# Patient Record
Sex: FEMALE | Race: WHITE | Employment: UNEMPLOYED
[De-identification: names, ages, dates, MRNs, and addresses within clinical notes are randomized per-mention and may not be internally consistent; named-entity substitution may affect disease eponyms.]

---

## 2017-06-03 ENCOUNTER — HEALTH MAINTENANCE LETTER (OUTPATIENT)
Age: 47
End: 2017-06-03

## 2018-11-16 ENCOUNTER — APPOINTMENT (OUTPATIENT)
Dept: CT IMAGING | Facility: CLINIC | Age: 48
End: 2018-11-16
Attending: EMERGENCY MEDICINE
Payer: COMMERCIAL

## 2018-11-16 ENCOUNTER — HOSPITAL ENCOUNTER (EMERGENCY)
Facility: CLINIC | Age: 48
Discharge: HOME | End: 2018-11-16
Attending: EMERGENCY MEDICINE | Admitting: EMERGENCY MEDICINE
Payer: COMMERCIAL

## 2018-11-16 ENCOUNTER — APPOINTMENT (OUTPATIENT)
Dept: ULTRASOUND IMAGING | Facility: CLINIC | Age: 48
End: 2018-11-16
Attending: EMERGENCY MEDICINE
Payer: COMMERCIAL

## 2018-11-16 VITALS
WEIGHT: 130 LBS | RESPIRATION RATE: 16 BRPM | TEMPERATURE: 98.3 F | HEIGHT: 67 IN | BODY MASS INDEX: 20.4 KG/M2 | OXYGEN SATURATION: 99 % | HEART RATE: 85 BPM | SYSTOLIC BLOOD PRESSURE: 97 MMHG | DIASTOLIC BLOOD PRESSURE: 66 MMHG

## 2018-11-16 DIAGNOSIS — R11.2 NAUSEA AND VOMITING, INTRACTABILITY OF VOMITING NOT SPECIFIED, UNSPECIFIED VOMITING TYPE: ICD-10-CM

## 2018-11-16 DIAGNOSIS — N83.201 CYST OF RIGHT OVARY: ICD-10-CM

## 2018-11-16 LAB
ALBUMIN SERPL-MCNC: 3.7 G/DL (ref 3.4–5)
ALBUMIN UR-MCNC: NEGATIVE MG/DL
ALP SERPL-CCNC: 43 U/L (ref 40–150)
ALT SERPL W P-5'-P-CCNC: 24 U/L (ref 0–50)
ANION GAP SERPL CALCULATED.3IONS-SCNC: 8 MMOL/L (ref 3–14)
APPEARANCE UR: CLEAR
AST SERPL W P-5'-P-CCNC: 19 U/L (ref 0–45)
BASOPHILS # BLD AUTO: 0 10E9/L (ref 0–0.2)
BASOPHILS NFR BLD AUTO: 0.1 %
BILIRUB SERPL-MCNC: 0.9 MG/DL (ref 0.2–1.3)
BILIRUB UR QL STRIP: NEGATIVE
BUN SERPL-MCNC: 13 MG/DL (ref 7–30)
CALCIUM SERPL-MCNC: 8.7 MG/DL (ref 8.5–10.1)
CHLORIDE SERPL-SCNC: 104 MMOL/L (ref 94–109)
CO2 SERPL-SCNC: 27 MMOL/L (ref 20–32)
COLOR UR AUTO: YELLOW
CREAT SERPL-MCNC: 0.56 MG/DL (ref 0.52–1.04)
DIFFERENTIAL METHOD BLD: ABNORMAL
EOSINOPHIL # BLD AUTO: 0 10E9/L (ref 0–0.7)
EOSINOPHIL NFR BLD AUTO: 0.1 %
ERYTHROCYTE [DISTWIDTH] IN BLOOD BY AUTOMATED COUNT: 11.9 % (ref 10–15)
GFR SERPL CREATININE-BSD FRML MDRD: >90 ML/MIN/1.7M2
GLUCOSE SERPL-MCNC: 105 MG/DL (ref 70–99)
GLUCOSE UR STRIP-MCNC: NEGATIVE MG/DL
HCG UR QL: NEGATIVE
HCT VFR BLD AUTO: 38.7 % (ref 35–47)
HGB BLD-MCNC: 13 G/DL (ref 11.7–15.7)
HGB UR QL STRIP: ABNORMAL
IMM GRANULOCYTES # BLD: 0 10E9/L (ref 0–0.4)
IMM GRANULOCYTES NFR BLD: 0.2 %
KETONES UR STRIP-MCNC: NEGATIVE MG/DL
LEUKOCYTE ESTERASE UR QL STRIP: NEGATIVE
LYMPHOCYTES # BLD AUTO: 0.6 10E9/L (ref 0.8–5.3)
LYMPHOCYTES NFR BLD AUTO: 3.6 %
MCH RBC QN AUTO: 33 PG (ref 26.5–33)
MCHC RBC AUTO-ENTMCNC: 33.6 G/DL (ref 31.5–36.5)
MCV RBC AUTO: 98 FL (ref 78–100)
MONOCYTES # BLD AUTO: 0.8 10E9/L (ref 0–1.3)
MONOCYTES NFR BLD AUTO: 5 %
NEUTROPHILS # BLD AUTO: 15.3 10E9/L (ref 1.6–8.3)
NEUTROPHILS NFR BLD AUTO: 91 %
NITRATE UR QL: NEGATIVE
PH UR STRIP: 8 PH (ref 5–7)
PLATELET # BLD AUTO: 232 10E9/L (ref 150–450)
POTASSIUM SERPL-SCNC: 3.9 MMOL/L (ref 3.4–5.3)
PROT SERPL-MCNC: 7.1 G/DL (ref 6.8–8.8)
RBC # BLD AUTO: 3.94 10E12/L (ref 3.8–5.2)
RBC #/AREA URNS AUTO: 2 /HPF (ref 0–2)
SODIUM SERPL-SCNC: 139 MMOL/L (ref 133–144)
SOURCE: ABNORMAL
SP GR UR STRIP: 1.02 (ref 1–1.03)
SQUAMOUS #/AREA URNS AUTO: 1 /HPF (ref 0–1)
UROBILINOGEN UR STRIP-MCNC: NORMAL MG/DL (ref 0–2)
WBC # BLD AUTO: 16.8 10E9/L (ref 4–11)
WBC #/AREA URNS AUTO: 0 /HPF (ref 0–5)

## 2018-11-16 PROCEDURE — 96361 HYDRATE IV INFUSION ADD-ON: CPT

## 2018-11-16 PROCEDURE — 96374 THER/PROPH/DIAG INJ IV PUSH: CPT | Mod: 59

## 2018-11-16 PROCEDURE — 25000128 H RX IP 250 OP 636: Performed by: EMERGENCY MEDICINE

## 2018-11-16 PROCEDURE — 99285 EMERGENCY DEPT VISIT HI MDM: CPT | Mod: 25

## 2018-11-16 PROCEDURE — 85025 COMPLETE CBC W/AUTO DIFF WBC: CPT | Performed by: EMERGENCY MEDICINE

## 2018-11-16 PROCEDURE — 74177 CT ABD & PELVIS W/CONTRAST: CPT

## 2018-11-16 PROCEDURE — 76830 TRANSVAGINAL US NON-OB: CPT

## 2018-11-16 PROCEDURE — 80053 COMPREHEN METABOLIC PANEL: CPT | Performed by: EMERGENCY MEDICINE

## 2018-11-16 PROCEDURE — 81001 URINALYSIS AUTO W/SCOPE: CPT | Performed by: EMERGENCY MEDICINE

## 2018-11-16 PROCEDURE — 81025 URINE PREGNANCY TEST: CPT | Performed by: EMERGENCY MEDICINE

## 2018-11-16 PROCEDURE — 25000125 ZZHC RX 250: Performed by: EMERGENCY MEDICINE

## 2018-11-16 RX ORDER — IOPAMIDOL 755 MG/ML
65 INJECTION, SOLUTION INTRAVASCULAR ONCE
Status: COMPLETED | OUTPATIENT
Start: 2018-11-16 | End: 2018-11-16

## 2018-11-16 RX ORDER — MORPHINE SULFATE 4 MG/ML
4 INJECTION, SOLUTION INTRAMUSCULAR; INTRAVENOUS ONCE
Status: COMPLETED | OUTPATIENT
Start: 2018-11-16 | End: 2018-11-16

## 2018-11-16 RX ADMIN — SODIUM CHLORIDE 1000 ML: 9 INJECTION, SOLUTION INTRAVENOUS at 11:26

## 2018-11-16 RX ADMIN — IOPAMIDOL 65 ML: 755 INJECTION, SOLUTION INTRAVENOUS at 10:27

## 2018-11-16 RX ADMIN — MORPHINE SULFATE 4 MG: 4 INJECTION INTRAVENOUS at 10:44

## 2018-11-16 RX ADMIN — SODIUM CHLORIDE, PRESERVATIVE FREE 60 ML: 5 INJECTION INTRAVENOUS at 10:26

## 2018-11-16 ASSESSMENT — ENCOUNTER SYMPTOMS
DIARRHEA: 0
BACK PAIN: 0
DYSURIA: 0
WEAKNESS: 1
SORE THROAT: 0
COUGH: 0
DIZZINESS: 1
SHORTNESS OF BREATH: 0
ABDOMINAL PAIN: 1
FEVER: 0
CONSTIPATION: 0
DIFFICULTY URINATING: 0
FREQUENCY: 0
HEMATURIA: 0
VOMITING: 1
NAUSEA: 1
FLANK PAIN: 0

## 2018-11-16 NOTE — DISCHARGE INSTRUCTIONS
Ovarian Cysts  A cyst is usually a fluid-filled sac, like a small water balloon. Cysts are almost always harmless, and many go away on their own. Usually they grow slowly. They can vary in size from as small as a pea to larger than a grapefruit. Many cause no symptoms at all. Often they are felt only during a pelvic exam. Ovarian cysts are usually not cancer.       Functional cyst  A functional cyst is the most common kind of cyst. It forms when a follicle does not release a mature egg or continues to grow after releasing the egg. Functional cysts usually occur on only one ovary at a time. They usually shrink on their own in 1 to 3 months. In rare cases, a cyst will burst (rupture), causing pain. Pain might also be caused by the twisting of an ovary that is enlarged because of the cyst growing on it.     Dermoid cyst  Sometimes cells that are present from birth will start to grow into different kinds of tissue such as skin, fat, hair, and teeth. This kind of cyst is called a dermoid cyst. Dermoid cysts can grow on one or both ovaries. Usually they cause no symptoms. But if they leak or the ovary becomes twisted, they can cause severe pain.    Endometrioma  Sometimes tissue similar to the lining of the uterus (endometrium) grows and becomes part of the ovary. This kind of cyst is often called a chocolate cyst because of its dark-brown color. These cysts can grow on one or both ovaries. They often cause pain, especially around menstruation or during sex.    Benign cystadenoma  If the capsule that surrounds the ovary grows, it can form a cystadenoma. These cysts can grow on one or both ovaries. Usually they cause no symptoms if they are small. But if they become large, they can press on organs near the ovaries, causing pain. They can also cause pain by stretching the ovarian capsule. A cyst that pushes on the bladder can cause frequent urination. Sometimes these cysts rupture and bleed.  Malignant cysts  These cysts  can invade other tissues or spread to other parts of the body.  Date Last Reviewed: 6/1/2017 2000-2018 [employer]. [address] [place] [address], [place] [address]. All rights reserved. This information is not intended as a substitute for professional medical care. Always follow your healthcare professional's instructions.

## 2018-11-16 NOTE — ED AVS SNAPSHOT
Emergency Department    [address] [facility] [address]    Phone:  [phone]    Fax:  [phone]                                       Elmira Mccarty   MRN: [id number]    Department:   Emergency Department   Date of Visit:  11/16/2018           Patient Information     Date Of Birth          1970        Your diagnoses for this visit were:     Cyst of right ovary     Nausea and vomiting, intractability of vomiting not specified, unspecified vomiting type        You were seen by Brenda Fulton MD.      Follow-up Information     Follow up with Greta Aguirre PA-C In 2 days.    Specialty:  Physician Assistant    Why:  Return to the ED if worsening pain, fevers, or persistent vomiting.,    Contact information:    [facility]  [address] [facility] [address]  [facility] [address]  [phone]          Discharge Instructions         Ovarian Cysts  A cyst is usually a fluid-filled sac, like a small water balloon. Cysts are almost always harmless, and many go away on their own. Usually they grow slowly. They can vary in size from as small as a pea to larger than a grapefruit. Many cause no symptoms at all. Often they are felt only during a pelvic exam. Ovarian cysts are usually not cancer.       Functional cyst  A functional cyst is the most common kind of cyst. It forms when a follicle does not release a mature egg or continues to grow after releasing the egg. Functional cysts usually occur on only one ovary at a time. They usually shrink on their own in 1 to 3 months. In rare cases, a cyst will burst (rupture), causing pain. Pain might also be caused by the twisting of an ovary that is enlarged because of the cyst growing on it.     Dermoid cyst  Sometimes cells that are present from birth will start to grow into different kinds of tissue such as skin, fat, hair, and teeth. This kind of cyst is called a dermoid cyst. Dermoid cysts can grow on one or both ovaries. Usually  they cause no symptoms. But if they leak or the ovary becomes twisted, they can cause severe pain.    Endometrioma  Sometimes tissue similar to the lining of the uterus (endometrium) grows and becomes part of the ovary. This kind of cyst is often called a chocolate cyst because of its dark-brown color. These cysts can grow on one or both ovaries. They often cause pain, especially around menstruation or during sex.    Benign cystadenoma  If the capsule that surrounds the ovary grows, it can form a cystadenoma. These cysts can grow on one or both ovaries. Usually they cause no symptoms if they are small. But if they become large, they can press on organs near the ovaries, causing pain. They can also cause pain by stretching the ovarian capsule. A cyst that pushes on the bladder can cause frequent urination. Sometimes these cysts rupture and bleed.  Malignant cysts  These cysts can invade other tissues or spread to other parts of the body.  Date Last Reviewed: 6/1/2017 2000-2018 [employer]. [address] [place] [address]. All rights reserved. This information is not intended as a substitute for professional medical care. Always follow your healthcare professional's instructions.          24 Hour Appointment Hotline       To make an appointment at any [facility], call [phone] ([phone]). If you don't have a family doctor or clinic, we will help you find one. [facility] are conveniently located to serve the needs of you and your family.             Review of your medicines      Our records show that you are taking the medicines listed below. If these are incorrect, please call your family doctor or clinic.        Dose / Directions Last dose taken    CITALOPRAM HYDROBROMIDE PO        Refills:  0        ZOVIRAX PO        Refills:  0                Procedures and tests performed during your visit     CBC with platelets differential    CT Abdomen Pelvis w Contrast     Comprehensive metabolic panel    Give 20 ounces of water 15 minutes before CT of abdomen    HCG qualitative urine    UA with Microscopic    US Pelvic Complete w Transvaginal & Abd/Pel Duplex Limited      Orders Needing Specimen Collection     None      Pending Results     No orders found from 11/14/2018 to 11/17/2018.            Pending Culture Results     No orders found from 11/14/2018 to 11/17/2018.            Pending Results Instructions     If you had any lab results that were not finalized at the time of your Discharge, you can call the ED Lab Result RN at [phone]. You will be contacted by this team for any positive Lab results or changes in treatment. The nurses are available 7 days a week from 10A to 6:30P.  You can leave a message 24 hours per day and they will return your call.        Test Results From Your Hospital Stay        11/16/2018 10:42 AM      Component Results     Component Value Ref Range & Units Status    WBC 16.8 (H) 4.0 - 11.0 10e9/L Final    RBC Count 3.94 3.8 - 5.2 10e12/L Final    Hemoglobin 13.0 11.7 - 15.7 g/dL Final    Hematocrit 38.7 35.0 - 47.0 % Final    MCV 98 78 - 100 fl Final    MCH 33.0 26.5 - 33.0 pg Final    MCHC 33.6 31.5 - 36.5 g/dL Final    RDW 11.9 10.0 - 15.0 % Final    Platelet Count 232 150 - 450 10e9/L Final    Diff Method Automated Method  Final    % Neutrophils 91.0 % Final    % Lymphocytes 3.6 % Final    % Monocytes 5.0 % Final    % Eosinophils 0.1 % Final    % Basophils 0.1 % Final    % Immature Granulocytes 0.2 % Final    Absolute Neutrophil 15.3 (H) 1.6 - 8.3 10e9/L Final    Absolute Lymphocytes 0.6 (L) 0.8 - 5.3 10e9/L Final    Absolute Monocytes 0.8 0.0 - 1.3 10e9/L Final    Absolute Eosinophils 0.0 0.0 - 0.7 10e9/L Final    Absolute Basophils 0.0 0.0 - 0.2 10e9/L Final    Abs Immature Granulocytes 0.0 0 - 0.4 10e9/L Final         11/16/2018 10:03 AM      Component Results     Component Value Ref Range & Units Status    Sodium 139 133 - 144 mmol/L Final     Potassium 3.9 3.4 - 5.3 mmol/L Final    Chloride 104 94 - 109 mmol/L Final    Carbon Dioxide 27 20 - 32 mmol/L Final    Anion Gap 8 3 - 14 mmol/L Final    Glucose 105 (H) 70 - 99 mg/dL Final    Urea Nitrogen 13 7 - 30 mg/dL Final    Creatinine 0.56 0.52 - 1.04 mg/dL Final    GFR Estimate >90 >60 mL/min/1.7m2 Final    Non  GFR Calc    GFR Estimate If Black >90 >60 mL/min/1.7m2 Final    African American GFR Calc    Calcium 8.7 8.5 - 10.1 mg/dL Final    Bilirubin Total 0.9 0.2 - 1.3 mg/dL Final    Albumin 3.7 3.4 - 5.0 g/dL Final    Protein Total 7.1 6.8 - 8.8 g/dL Final    Alkaline Phosphatase 43 40 - 150 U/L Final    ALT 24 0 - 50 U/L Final    AST 19 0 - 45 U/L Final         11/16/2018  9:48 AM      Component Results     Component Value Ref Range & Units Status    HCG Qual Urine Negative NEG^Negative Final    This test is for screening purposes.  Results should be interpreted along with   the clinical picture.  Confirmation testing is available if warranted by   ordering [id number], HCG Quantitative Pregnancy.           11/16/2018  9:47 AM      Component Results     Component Value Ref Range & Units Status    Color Urine Yellow  Final    Appearance Urine Clear  Final    Glucose Urine Negative NEG^Negative mg/dL Final    Bilirubin Urine Negative NEG^Negative Final    Ketones Urine Negative NEG^Negative mg/dL Final    Specific Gravity Urine 1.017 1.003 - 1.035 Final    Blood Urine Trace (A) NEG^Negative Final    pH Urine 8.0 (H) 5.0 - 7.0 pH Final    Protein Albumin Urine Negative NEG^Negative mg/dL Final    Urobilinogen mg/dL Normal 0.0 - 2.0 mg/dL Final    Nitrite Urine Negative NEG^Negative Final    Leukocyte Esterase Urine Negative NEG^Negative Final    Source Midstream Urine  Final    WBC Urine 0 0 - 5 /HPF Final    RBC Urine 2 0 - 2 /HPF Final    Squamous Epithelial /HPF Urine 1 0 - 1 /HPF Final         11/16/2018 10:56 AM      Narrative     CT ABDOMEN AND PELVIS WITH CONTRAST  11/16/2018 10:39  AM    HISTORY: Right lower quadrant pain.    COMPARISON: None.    TECHNIQUE: Routine transverse CT imaging of the abdomen and pelvis was  performed following the uneventful administration of 65 mL Isovue-370  intravenous contrast. Radiation dose for this scan was reduced using  automated exposure control, adjustment of the mA and/or kV according  to patient size, or iterative reconstruction technique.    FINDINGS: There is mild dependent atelectasis of both lower lungs. The  visualized lungs are otherwise clear. The liver, spleen, pancreas,  gallbladder, adrenal glands, and bladder are normal. There is a 0.2 cm  nonobstructing calculus in the left mid kidney. The kidneys are  otherwise unremarkable. No enlarged lymph node is seen. There are two  adjacent cysts or a single septated cyst in the right adnexa of the  pelvis, likely an ovarian cyst. This measures up to 3.4 cm in greatest  dimension. A few small cysts within the lower uterus likely represent  nabothian cysts. No other abnormal mass is seen. No free fluid is  seen. No free intraperitoneal gas is identified. Fluid is seen within  a few nondistended loops of small bowel. The gastrointestinal tract is  otherwise unremarkable. There is a normal-appearing appendix. No  vascular abnormality is seen. The osseous structures are unremarkable.  No abdominal or pelvic wall pathology is demonstrated.         Impression     IMPRESSION:   1. There is a 0.2 cm nonobstructing calculus of the left kidney.  2. There is a 3.4 cm septated cyst or two adjacent smaller cysts in  the right adnexa of the pelvis, likely ovarian in origin.  3. Normal-appearing appendix.     KARISSA TRAVIS MD         11/16/2018 12:04 PM      Narrative     PELVIC ULTRASOUND TRANSABDOMINAL IMAGING AND TRANSVAGINAL IMAGING   11/16/2018 11:51 AM    HISTORY: Pelvic pain.     COMPARISON: None.    TECHNIQUE: Transabdominal imaging was performed. Transvaginal imaging  was also performed.     FINDING:  The uterus measures 9.2 x 5.8 x 4.8 cm. It demonstrates  normal echogenicity with no myometrial abnormality seen. The  endometrium is normal measuring 0.9 cm. The right ovary contains two  follicles measuring 1.8 and 1.7 cm, respectively. The left ovary is  normal. Normal blood flow is seen in both ovaries. No adnexal  pathology is seen. There is no free fluid in the cul-de-sac.        Impression     IMPRESSION: Simple follicular cysts of the right ovary.    KARISSA TRAVIS MD                Clinical Quality Measure: Blood Pressure Screening     Your blood pressure was checked while you were in the emergency department today. The last reading we obtained was  BP: 91/57 . Please read the guidelines below about what these numbers mean and what you should do about them.  If your systolic blood pressure (the top number) is less than 120 and your diastolic blood pressure (the bottom number) is less than 80, then your blood pressure is normal. There is nothing more that you need to do about it.  If your systolic blood pressure (the top number) is 120-139 or your diastolic blood pressure (the bottom number) is 80-89, your blood pressure may be higher than it should be. You should have your blood pressure rechecked within a year by a primary care provider.  If your systolic blood pressure (the top number) is 140 or greater or your diastolic blood pressure (the bottom number) is 90 or greater, you may have high blood pressure. High blood pressure is treatable, but if left untreated over time it can put you at risk for heart attack, stroke, or kidney failure. You should have your blood pressure rechecked by a primary care provider within the next 4 weeks.  If your provider in the emergency department today gave you specific instructions to follow-up with your doctor or provider even sooner than that, you should follow that instruction and not wait for up to 4 weeks for your follow-up visit.        Thank you for choosing  [facility]       Thank you for choosing [facility] for your care. Our goal is always to provide you with excellent care. Hearing back from our patients is one way we can continue to improve our services. Please take a few minutes to complete the written survey that you may receive in the mail after you visit with us. Thank you!        [facility] Information     [facility] gives you secure access to your electronic health record. If you see a primary care provider, you can also send messages to your care team and make appointments. If you have questions, please call your primary care clinic.  If you do not have a primary care provider, please call [phone] and they will assist you.        Care EveryWhere ID     This is your Care EveryWhere ID. This could be used by other organizations to access your [facility] medical records  [id number]        Equal Access to Services     TAMIKO IBARRA : Kurtis Kaur, yan horn, edmund shah, guru meng. So [facility] [phone].    ATENCIÓN: Si habla español, tiene a nichols disposición servicios gratuitos de asistencia lingüística. Llame al [phone].    We comply with applicable federal civil rights laws and [place] laws. We do not discriminate on the basis of race, color, national origin, age, disability, sex, sexual orientation, or gender identity.            After Visit Summary       This is your record. Keep this with you and show to your community pharmacist(s) and doctor(s) at your next visit.

## 2018-11-16 NOTE — ED PROVIDER NOTES
"  History     Chief Complaint:  Abdominal Pain    HPI   Elmira Mccarty is a 47 year old female who presents with abdominal pain. Patient states that she woke up this morning at around 0400 with right sided abdominal pain, nausea, vomiting, weakness, and dizziness. She describe the pain as cramping. She is currently on her menstrual period, these have become more irregular. She states that she has never had this pain before. She denies any fever, sore throat, cough, shortness of breath, back pain, flank pain, urinary or bowel abnormalities, recent travel, recent antibiotics, and history of kidney stones or gall stones.    Allergies:  No known drug allergies.    Medications:    The patient is currently on no regular medications.    Past Medical History:    History reviewed.  No significant past medical history.     Past Surgical History:     section    Family History:    History reviewed. No pertinent family history.    Social History:  Patient is marred  PCP: Greta Aguirre     Review of Systems   Constitutional: Negative for fever.   HENT: Negative for sore throat.    Respiratory: Negative for cough and shortness of breath.    Gastrointestinal: Positive for abdominal pain, nausea and vomiting. Negative for constipation and diarrhea.   Genitourinary: Negative for difficulty urinating, dysuria, flank pain, frequency and hematuria.   Musculoskeletal: Negative for back pain.   Neurological: Positive for dizziness and weakness.   All other systems reviewed and are negative.    Physical Exam   First Vitals:  Patient Vitals for the past 24 hrs:   BP Temp Temp src Pulse Resp SpO2 Height Weight   18 1124 91/57 - - - - 99 % - -   18 1100 98/57 - - - - 98 % - -   18 1047 94/61 - - - - 100 % - -   18 0910 101/56 98.3  F (36.8  C) Temporal 85 16 99 % 1.702 m (5' 7\") 59 kg (130 lb)     Physical Exam    Physical Exam   Constitutional:  Patient is oriented to person, place, and time. They " appear well-developed and well-nourished. Mild distress secondary to abdominal pain.   HENT:   Mouth/Throat:   Oropharynx is clear and moist.   Eyes:    Conjunctivae normal and EOM are normal. Pupils are equal, round, and reactive to light.   Neck:    Normal range of motion.   Cardiovascular: Normal rate, regular rhythm and normal heart sounds.  Exam reveals no gallop and no friction rub.  No murmur heard.  Pulmonary/Chest:  Effort normal and breath sounds normal. Patient has no wheezes. Patient has no rales.   Abdominal:   Soft. Bowel sounds are normal. Patient exhibits no mass. Right lower quadrant pain that's a little migratory. There is no rebound and no guarding.   Musculoskeletal:  Normal range of motion. Patient exhibits no edema.   Neurological:   Patient is alert and oriented to person, place, and time. Patient has normal strength. No cranial nerve deficit or sensory deficit. GCS 15  Skin:   Skin is warm and dry. No rash noted. No erythema.   Psychiatric:   Patient has a normal mood and affect. Patient's behavior is normal. Judgment and thought content normal.         Emergency Department Course     Imaging:  Radiographic findings were communicated with the patient who voiced understanding of the findings.  CT abdomen pelvis w contrast:  1. There is a 0.2 cm nonobstructing calculus of the left kidney.  2. There is a 3.4 cm septated cyst or two adjacent smaller cysts in  the right adnexa of the pelvis, likely ovarian in origin.  3. Normal-appearing appendix. Per Radiology read.  US pelvis complete w transvaginal & abd/pel duplex limited:  Simple follicular cysts of the right ovary per Radiology read.    Laboratory:  CBC:  WBC 16.8 high, HGB 13.0,   CMP: Glucose 105 high, o/w WNL. (Creatinine 0.56)  HCG: Negative  UA: Blood trace (A), Ph 8.0 high, o/w WNL    Interventions:  1044: Morphine 4mg IV  1027: Isovue 65mL IV  1126: NS 1L IV    Emergency Department Course:  Nursing notes and vitals reviewed.  I  performed an exam of the patient as documented above.   11:17 AM Rechecked on and update the patient.  Findings and plan explained to the patient. She was made aware of the incidental finding of a non-obstructive kidney stone. Patient discharged home with instructions regarding supportive care, medications, and reasons to return. The importance of close follow-up was reviewed.     Impression & Plan      Medical Decision Making:  Elmira Mccarty is a 47 year old female presenting with right-sided abdominal pain and vomiting. She did not have a surgical abdomen on exam but she did have reproducible right lower quadrant pain. Blood work and imaging was performed as noted above. She does have leukocytosis but in the setting of no fever or infectious etiology and finding on CT this may be demargination due to her vomiting episode this morning. Imaging of her abdomen using a CT was initially performed and there was no evidence of appendicitis, bowel obstructions, colitis, pyelonephritis, and diverticulitis. She does have an non obstructive kidney stone which is not causing the problem, she was made aware of this, and there is a right ovarian cyst. Being that this is the side where she is having her pain, I did an ultrasound to further evaluate for any kind of abscess, rupture, torsion, and fortunately this shows a follicular cyst. It is noted she has low blood pressure. She states her normal pressure is below 100 systolic.     At this time, she is safe to be discharged, her pain is controlled, she tolerated PO, and she has no acute findings that warrant admission. I would like her to return to the emergency department if she had worsening pain, develops fever, or has persistent vomiting, and to otherwise follow up with her primary care doctor.      Diagnosis:    ICD-10-CM    1. Cyst of right ovary N83.201    2. Nausea and vomiting, intractability of vomiting not specified, unspecified vomiting type R11.2         Disposition:  discharged to home    I, Bradley Aasen, am serving as a scribe on 11/16/2018 at 9:10 AM to personally document services performed by Brenda Fulton MD based on my observations and the provider's statements to me.        Brenda Fulton MD  11/16/18 [id number]

## 2018-11-16 NOTE — ED AVS SNAPSHOT
Emergency Department    [address] [place] [address]    Phone:  [phone]    Fax:  [phone]                                       Elmira Mccarty   MRN: [id number]    Department:   Emergency Department   Date of Visit:  11/16/2018           After Visit Summary Signature Page     I have received my discharge instructions, and my questions have been answered. I have discussed any challenges I see with this plan with the nurse or doctor.    ..........................................................................................................................................  Patient/Patient Representative Signature      ..........................................................................................................................................  Patient Representative Print Name and Relationship to Patient    ..................................................               ................................................  Date                                   Time    ..........................................................................................................................................  Reviewed by Signature/Title    ...................................................              ..............................................  Date                                               Time          22EPIC Rev 08/18

## 2019-09-29 ENCOUNTER — HEALTH MAINTENANCE LETTER (OUTPATIENT)
Age: 49
End: 2019-09-29

## 2021-01-14 ENCOUNTER — HEALTH MAINTENANCE LETTER (OUTPATIENT)
Age: 51
End: 2021-01-14

## 2021-10-23 ENCOUNTER — HEALTH MAINTENANCE LETTER (OUTPATIENT)
Age: 51
End: 2021-10-23

## 2022-02-12 ENCOUNTER — HEALTH MAINTENANCE LETTER (OUTPATIENT)
Age: 52
End: 2022-02-12

## 2022-10-10 ENCOUNTER — HEALTH MAINTENANCE LETTER (OUTPATIENT)
Age: 52
End: 2022-10-10

## 2023-03-25 ENCOUNTER — HEALTH MAINTENANCE LETTER (OUTPATIENT)
Age: 53
End: 2023-03-25